# Patient Record
Sex: FEMALE | Race: ASIAN | NOT HISPANIC OR LATINO | URBAN - METROPOLITAN AREA
[De-identification: names, ages, dates, MRNs, and addresses within clinical notes are randomized per-mention and may not be internally consistent; named-entity substitution may affect disease eponyms.]

---

## 2022-04-27 ENCOUNTER — APPOINTMENT (OUTPATIENT)
Dept: URBAN - METROPOLITAN AREA CLINIC 288 | Age: 62
Setting detail: DERMATOLOGY
End: 2022-04-29

## 2022-04-27 DIAGNOSIS — B07.8 OTHER VIRAL WARTS: ICD-10-CM

## 2022-04-27 DIAGNOSIS — L30.9 DERMATITIS, UNSPECIFIED: ICD-10-CM

## 2022-04-27 DIAGNOSIS — L28.0 LICHEN SIMPLEX CHRONICUS: ICD-10-CM

## 2022-04-27 PROCEDURE — OTHER COUNSELING: OTHER

## 2022-04-27 PROCEDURE — OTHER DEFER: OTHER

## 2022-04-27 PROCEDURE — 99203 OFFICE O/P NEW LOW 30 MIN: CPT

## 2022-04-27 PROCEDURE — OTHER PRESCRIPTION MEDICATION MANAGEMENT: OTHER

## 2022-04-27 PROCEDURE — OTHER PRESCRIPTION: OTHER

## 2022-04-27 RX ORDER — CLOBETASOL PROPIONATE 0.5 MG/G
OINTMENT TOPICAL
Qty: 30 | Refills: 3 | Status: ERX | COMMUNITY
Start: 2022-04-27

## 2022-04-27 ASSESSMENT — LOCATION DETAILED DESCRIPTION DERM
LOCATION DETAILED: RIGHT DISTAL PRETIBIAL REGION
LOCATION DETAILED: LEFT DISTAL PRETIBIAL REGION

## 2022-04-27 ASSESSMENT — LOCATION ZONE DERM: LOCATION ZONE: LEG

## 2022-04-27 ASSESSMENT — LOCATION SIMPLE DESCRIPTION DERM
LOCATION SIMPLE: LEFT PRETIBIAL REGION
LOCATION SIMPLE: RIGHT PRETIBIAL REGION

## 2022-04-27 NOTE — PROCEDURE: PRESCRIPTION MEDICATION MANAGEMENT
Plan: In the morning after taking the plastic wrap off and rinsing legs apply lotion with urea in it.
Initiate Treatment: clobetasol 0.05 % topical ointment \\nApply to affected areas under plastic wrap every night for two week cycles.
Detail Level: Zone
Render In Strict Bullet Format?: No

## 2022-07-07 ENCOUNTER — OFFICE VISIT (OUTPATIENT)
Dept: URBAN - METROPOLITAN AREA CLINIC 44 | Facility: CLINIC | Age: 62
End: 2022-07-07

## 2022-07-07 DIAGNOSIS — H43.391 OTHER VITREOUS OPACITIES, RIGHT EYE: ICD-10-CM

## 2022-07-07 DIAGNOSIS — H34.8110 CENTRAL RETINAL VEIN OCCLS, RIGHT EYE, WITH MACULAR EDEMA: Primary | ICD-10-CM

## 2022-07-07 PROCEDURE — 67028 INJECTION EYE DRUG: CPT | Performed by: OPHTHALMOLOGY

## 2022-07-07 PROCEDURE — 99204 OFFICE O/P NEW MOD 45 MIN: CPT | Performed by: OPHTHALMOLOGY

## 2022-07-07 PROCEDURE — 92134 CPTRZ OPH DX IMG PST SGM RTA: CPT | Performed by: OPHTHALMOLOGY

## 2022-07-07 PROCEDURE — 92235 FLUORESCEIN ANGRPH MLTIFRAME: CPT | Performed by: OPHTHALMOLOGY

## 2022-07-07 ASSESSMENT — INTRAOCULAR PRESSURE
OS: 16
OD: 18

## 2022-07-07 NOTE — IMPRESSION/PLAN
Impression: Central retinal vein occls, right eye, with macular edema: H34.8110. Plan: severely ischemic CRVO, CME, no NV. explained in detail with patient, recommend injection tx. disc r/b/a's, will inject Avastin OD today and will return for Ozurdex in 2 weeks. obtain auth. urged HTN control, disc possible future surgical or laser intervention.  

RTC 2 week ND Ozurdex OD

## 2022-08-10 ENCOUNTER — APPOINTMENT (OUTPATIENT)
Age: 62
Setting detail: DERMATOLOGY
End: 2022-08-10

## 2022-08-10 DIAGNOSIS — L30.9 DERMATITIS, UNSPECIFIED: ICD-10-CM

## 2022-08-10 PROCEDURE — OTHER COUNSELING: OTHER

## 2022-08-10 PROCEDURE — 99212 OFFICE O/P EST SF 10 MIN: CPT

## 2022-08-10 PROCEDURE — OTHER PRESCRIPTION: OTHER

## 2022-08-10 RX ORDER — CLOBETASOL PROPIONATE 0.5 MG/G
OINTMENT TOPICAL
Qty: 60 | Refills: 2 | Status: ERX

## 2022-08-10 ASSESSMENT — LOCATION SIMPLE DESCRIPTION DERM
LOCATION SIMPLE: RIGHT PRETIBIAL REGION
LOCATION SIMPLE: LEFT PRETIBIAL REGION

## 2022-08-10 ASSESSMENT — LOCATION DETAILED DESCRIPTION DERM
LOCATION DETAILED: LEFT DISTAL PRETIBIAL REGION
LOCATION DETAILED: RIGHT DISTAL PRETIBIAL REGION

## 2022-08-10 ASSESSMENT — LOCATION ZONE DERM: LOCATION ZONE: LEG

## 2022-09-01 ENCOUNTER — OFFICE VISIT (OUTPATIENT)
Dept: URBAN - METROPOLITAN AREA CLINIC 44 | Facility: CLINIC | Age: 62
End: 2022-09-01

## 2022-09-01 DIAGNOSIS — H34.8110 CENTRAL RETINAL VEIN OCCLS, RIGHT EYE, WITH MACULAR EDEMA: Primary | ICD-10-CM

## 2022-09-01 PROCEDURE — 92134 CPTRZ OPH DX IMG PST SGM RTA: CPT | Performed by: OPHTHALMOLOGY

## 2022-09-01 PROCEDURE — 67028 INJECTION EYE DRUG: CPT | Performed by: OPHTHALMOLOGY

## 2022-09-01 ASSESSMENT — INTRAOCULAR PRESSURE
OS: 17
OD: 16

## 2022-09-01 NOTE — IMPRESSION/PLAN
Impression: Central retinal vein occls, right eye, with macular edema: H34.8110.  Plan: inject Ozurdex Sample OD

RTC 6-7 weeks

## 2022-10-21 ENCOUNTER — OFFICE VISIT (OUTPATIENT)
Dept: URBAN - METROPOLITAN AREA CLINIC 44 | Facility: CLINIC | Age: 62
End: 2022-10-21

## 2022-10-21 DIAGNOSIS — H34.8110 CENTRAL RETINAL VEIN OCCLUSION, RIGHT EYE, WITH MACULAR EDEMA: Primary | ICD-10-CM

## 2022-10-21 PROCEDURE — 92134 CPTRZ OPH DX IMG PST SGM RTA: CPT | Performed by: OPHTHALMOLOGY

## 2022-10-21 PROCEDURE — 67028 INJECTION EYE DRUG: CPT | Performed by: OPHTHALMOLOGY

## 2022-10-21 ASSESSMENT — INTRAOCULAR PRESSURE
OD: 15
OS: 15

## 2022-10-21 NOTE — IMPRESSION/PLAN
Impression: Central retinal vein occls, right eye, with macular edema: H34.8110. Plan: CRVO with CME OD - severe ischemia. Continue [id number] avastin OD Avastin 1/3 OD administered today without complication. R/B/A discussed at length.  Follow-up in 4 weeks for avastin 2/3 OD

## 2023-01-13 ENCOUNTER — PROCEDURE (OUTPATIENT)
Dept: URBAN - METROPOLITAN AREA CLINIC 44 | Facility: CLINIC | Age: 63
End: 2023-01-13

## 2023-01-13 DIAGNOSIS — H34.8110 CENTRAL RETINAL VEIN OCCLS, RIGHT EYE, WITH MACULAR EDEMA: Primary | ICD-10-CM

## 2023-01-13 PROCEDURE — 92134 CPTRZ OPH DX IMG PST SGM RTA: CPT | Performed by: OPHTHALMOLOGY

## 2023-01-13 PROCEDURE — 67028 INJECTION EYE DRUG: CPT | Performed by: OPHTHALMOLOGY

## 2023-01-13 ASSESSMENT — INTRAOCULAR PRESSURE
OS: 14
OD: 14

## 2023-01-13 NOTE — IMPRESSION/PLAN
Impression: Central retinal vein occls, right eye, with macular edema: H34.8110. Plan: CRVO with CME OD - severe ischemia. Continue [id number] avastin OD Avastin 2/3 OD administered today without complication. R/B/A discussed at length.  Follow-up in 4 weeks for avastin 3/3 OD

## 2024-07-02 ENCOUNTER — OFFICE VISIT (OUTPATIENT)
Dept: URBAN - METROPOLITAN AREA CLINIC 56 | Facility: LOCATION | Age: 64
End: 2024-07-02
Payer: COMMERCIAL

## 2024-07-02 DIAGNOSIS — H25.813 COMBINED FORMS OF AGE-RELATED CATARACT, BILATERAL: ICD-10-CM

## 2024-07-02 DIAGNOSIS — H40.011 OPEN ANGLE WITH BORDERLINE FINDINGS, LOW RISK, RIGHT EYE: ICD-10-CM

## 2024-07-02 DIAGNOSIS — H34.8110 CENTRAL RETINAL VEIN OCCLS, RIGHT EYE, WITH MACULAR EDEMA: Primary | ICD-10-CM

## 2024-07-02 DIAGNOSIS — H43.391 OTHER VITREOUS OPACITIES, RIGHT EYE: ICD-10-CM

## 2024-07-02 PROCEDURE — 92014 COMPRE OPH EXAM EST PT 1/>: CPT | Performed by: OPHTHALMOLOGY

## 2024-07-02 PROCEDURE — 92134 CPTRZ OPH DX IMG PST SGM RTA: CPT | Performed by: OPHTHALMOLOGY

## 2024-07-02 ASSESSMENT — VISUAL ACUITY
OD: 20/500
OS: 20/25

## 2024-07-02 ASSESSMENT — INTRAOCULAR PRESSURE
OS: 16
OD: 16

## 2024-07-02 ASSESSMENT — KERATOMETRY
OS: 46.45
OD: 46.36

## 2024-08-07 ENCOUNTER — OFFICE VISIT (OUTPATIENT)
Dept: URBAN - METROPOLITAN AREA CLINIC 56 | Facility: LOCATION | Age: 64
End: 2024-08-07
Payer: COMMERCIAL

## 2024-08-07 DIAGNOSIS — H34.8110 CENTRAL RETINAL VEIN OCCLUSION, RIGHT EYE, WITH MACULAR EDEMA: Primary | ICD-10-CM

## 2024-08-07 PROCEDURE — 92134 CPTRZ OPH DX IMG PST SGM RTA: CPT | Performed by: OPHTHALMOLOGY

## 2024-08-07 PROCEDURE — 92235 FLUORESCEIN ANGRPH MLTIFRAME: CPT | Performed by: OPHTHALMOLOGY

## 2024-08-07 PROCEDURE — 92014 COMPRE OPH EXAM EST PT 1/>: CPT | Performed by: OPHTHALMOLOGY

## 2024-08-07 ASSESSMENT — INTRAOCULAR PRESSURE
OS: 16
OD: 18

## 2024-08-21 ENCOUNTER — OFFICE VISIT (OUTPATIENT)
Dept: URBAN - METROPOLITAN AREA CLINIC 56 | Facility: LOCATION | Age: 64
End: 2024-08-21
Payer: COMMERCIAL

## 2024-08-21 DIAGNOSIS — H34.8110 CENTRAL RETINAL VEIN OCCLUSION, RIGHT EYE, WITH MACULAR EDEMA: Primary | ICD-10-CM

## 2024-08-21 PROCEDURE — 67028 INJECTION EYE DRUG: CPT | Performed by: OPHTHALMOLOGY

## 2024-08-21 PROCEDURE — 92134 CPTRZ OPH DX IMG PST SGM RTA: CPT | Performed by: OPHTHALMOLOGY

## 2024-08-21 ASSESSMENT — INTRAOCULAR PRESSURE
OD: 13
OS: 15

## 2024-10-02 ENCOUNTER — OFFICE VISIT (OUTPATIENT)
Dept: URBAN - METROPOLITAN AREA CLINIC 56 | Facility: LOCATION | Age: 64
End: 2024-10-02
Payer: COMMERCIAL

## 2024-10-02 DIAGNOSIS — H34.8110 CENTRAL RETINAL VEIN OCCLUSION, RIGHT EYE, WITH MACULAR EDEMA: Primary | ICD-10-CM

## 2024-10-02 PROCEDURE — 67028 INJECTION EYE DRUG: CPT | Performed by: OPHTHALMOLOGY

## 2024-10-02 PROCEDURE — 92134 CPTRZ OPH DX IMG PST SGM RTA: CPT | Performed by: OPHTHALMOLOGY

## 2024-10-02 ASSESSMENT — INTRAOCULAR PRESSURE
OS: 15
OD: 16

## 2025-01-22 ENCOUNTER — OFFICE VISIT (OUTPATIENT)
Dept: URBAN - METROPOLITAN AREA CLINIC 56 | Facility: LOCATION | Age: 65
End: 2025-01-22
Payer: COMMERCIAL

## 2025-01-22 DIAGNOSIS — H34.8110 CENTRAL RETINAL VEIN OCCLUSION, RIGHT EYE, WITH MACULAR EDEMA: Primary | ICD-10-CM

## 2025-01-22 PROCEDURE — 67028 INJECTION EYE DRUG: CPT | Performed by: OPHTHALMOLOGY

## 2025-01-22 PROCEDURE — 92014 COMPRE OPH EXAM EST PT 1/>: CPT | Performed by: OPHTHALMOLOGY

## 2025-01-22 PROCEDURE — 92235 FLUORESCEIN ANGRPH MLTIFRAME: CPT | Performed by: OPHTHALMOLOGY

## 2025-01-22 PROCEDURE — 92134 CPTRZ OPH DX IMG PST SGM RTA: CPT | Performed by: OPHTHALMOLOGY

## 2025-01-22 ASSESSMENT — INTRAOCULAR PRESSURE
OD: 18
OS: 19

## 2025-02-19 ENCOUNTER — OFFICE VISIT (OUTPATIENT)
Dept: URBAN - METROPOLITAN AREA CLINIC 56 | Facility: LOCATION | Age: 65
End: 2025-02-19
Payer: COMMERCIAL

## 2025-02-19 DIAGNOSIS — H34.8110 CENTRAL RETINAL VEIN OCCLUSION, RIGHT EYE, WITH MACULAR EDEMA: Primary | ICD-10-CM

## 2025-02-19 PROCEDURE — 92134 CPTRZ OPH DX IMG PST SGM RTA: CPT | Performed by: OPHTHALMOLOGY

## 2025-02-19 PROCEDURE — 67028 INJECTION EYE DRUG: CPT | Performed by: OPHTHALMOLOGY

## 2025-02-19 ASSESSMENT — INTRAOCULAR PRESSURE
OD: 20
OS: 14

## 2025-03-19 ENCOUNTER — OFFICE VISIT (OUTPATIENT)
Dept: URBAN - METROPOLITAN AREA CLINIC 56 | Facility: LOCATION | Age: 65
End: 2025-03-19
Payer: COMMERCIAL

## 2025-03-19 DIAGNOSIS — H34.8110 CENTRAL RETINAL VEIN OCCLUSION, RIGHT EYE, WITH MACULAR EDEMA: Primary | ICD-10-CM

## 2025-03-19 PROCEDURE — 92134 CPTRZ OPH DX IMG PST SGM RTA: CPT | Performed by: OPHTHALMOLOGY

## 2025-03-19 PROCEDURE — 67028 INJECTION EYE DRUG: CPT | Performed by: OPHTHALMOLOGY

## 2025-03-19 ASSESSMENT — INTRAOCULAR PRESSURE
OD: 21
OS: 19

## 2025-04-16 ENCOUNTER — OFFICE VISIT (OUTPATIENT)
Dept: URBAN - METROPOLITAN AREA CLINIC 56 | Facility: LOCATION | Age: 65
End: 2025-04-16
Payer: COMMERCIAL

## 2025-04-16 DIAGNOSIS — H34.8110 CENTRAL RETINAL VEIN OCCLUSION, RIGHT EYE, WITH MACULAR EDEMA: Primary | ICD-10-CM

## 2025-04-16 PROCEDURE — 67028 INJECTION EYE DRUG: CPT | Performed by: OPHTHALMOLOGY

## 2025-04-16 PROCEDURE — 92134 CPTRZ OPH DX IMG PST SGM RTA: CPT | Performed by: OPHTHALMOLOGY

## 2025-04-16 ASSESSMENT — INTRAOCULAR PRESSURE
OS: 20
OD: 19

## 2025-05-19 ENCOUNTER — OFFICE VISIT (OUTPATIENT)
Dept: URBAN - METROPOLITAN AREA CLINIC 51 | Facility: CLINIC | Age: 65
End: 2025-05-19
Payer: COMMERCIAL

## 2025-05-19 DIAGNOSIS — H34.8110 CENTRAL RETINAL VEIN OCCLUSION, RIGHT EYE, WITH MACULAR EDEMA: Primary | ICD-10-CM

## 2025-05-19 PROCEDURE — 92014 COMPRE OPH EXAM EST PT 1/>: CPT | Performed by: OPHTHALMOLOGY

## 2025-05-19 PROCEDURE — 67028 INJECTION EYE DRUG: CPT | Performed by: OPHTHALMOLOGY

## 2025-05-19 PROCEDURE — 92235 FLUORESCEIN ANGRPH MLTIFRAME: CPT | Performed by: OPHTHALMOLOGY

## 2025-05-19 PROCEDURE — 92134 CPTRZ OPH DX IMG PST SGM RTA: CPT | Performed by: OPHTHALMOLOGY

## 2025-05-19 ASSESSMENT — INTRAOCULAR PRESSURE
OD: 21
OS: 18